# Patient Record
Sex: FEMALE | Race: WHITE | Employment: UNEMPLOYED | URBAN - METROPOLITAN AREA
[De-identification: names, ages, dates, MRNs, and addresses within clinical notes are randomized per-mention and may not be internally consistent; named-entity substitution may affect disease eponyms.]

---

## 2017-01-27 PROBLEM — D25.9 UTERINE LEIOMYOMA: Status: ACTIVE | Noted: 2017-01-27

## 2017-01-27 PROBLEM — G47.00 INSOMNIA: Status: ACTIVE | Noted: 2017-01-27

## 2017-01-27 PROBLEM — F39 MOOD DISORDER (HCC): Status: ACTIVE | Noted: 2017-01-27

## 2017-01-27 PROBLEM — F41.1 GAD (GENERALIZED ANXIETY DISORDER): Status: ACTIVE | Noted: 2017-01-27

## 2017-01-27 PROBLEM — F41.0 PANIC ATTACKS: Status: ACTIVE | Noted: 2017-01-27

## 2017-01-27 PROBLEM — Z86.69 HX OF MIGRAINES: Status: ACTIVE | Noted: 2017-01-27

## 2018-01-11 ENCOUNTER — OFFICE VISIT (OUTPATIENT)
Dept: FAMILY MEDICINE CLINIC | Age: 33
End: 2018-01-11
Payer: MEDICARE

## 2018-01-11 VITALS
HEIGHT: 59 IN | SYSTOLIC BLOOD PRESSURE: 109 MMHG | DIASTOLIC BLOOD PRESSURE: 74 MMHG | TEMPERATURE: 98.5 F | BODY MASS INDEX: 27.26 KG/M2 | OXYGEN SATURATION: 98 % | WEIGHT: 135.2 LBS | HEART RATE: 82 BPM

## 2018-01-11 DIAGNOSIS — Z87.898 H/O MOTION SICKNESS: ICD-10-CM

## 2018-01-11 DIAGNOSIS — M79.7 FIBROMYALGIA: Primary | ICD-10-CM

## 2018-01-11 DIAGNOSIS — J45.20 MILD INTERMITTENT ASTHMA WITHOUT COMPLICATION: ICD-10-CM

## 2018-01-11 DIAGNOSIS — K58.2 IRRITABLE BOWEL SYNDROME WITH BOTH CONSTIPATION AND DIARRHEA: ICD-10-CM

## 2018-01-11 DIAGNOSIS — J30.2 CHRONIC SEASONAL ALLERGIC RHINITIS, UNSPECIFIED TRIGGER: ICD-10-CM

## 2018-01-11 DIAGNOSIS — F41.1 GAD (GENERALIZED ANXIETY DISORDER): ICD-10-CM

## 2018-01-11 DIAGNOSIS — R29.818 DIFFICULTY BALANCING: ICD-10-CM

## 2018-01-11 DIAGNOSIS — F31.30 BIPOLAR I DISORDER, MOST RECENT EPISODE DEPRESSED (HCC): ICD-10-CM

## 2018-01-11 PROCEDURE — 99214 OFFICE O/P EST MOD 30 MIN: CPT | Performed by: FAMILY MEDICINE

## 2018-01-11 PROCEDURE — G8419 CALC BMI OUT NRM PARAM NOF/U: HCPCS | Performed by: FAMILY MEDICINE

## 2018-01-11 PROCEDURE — 1036F TOBACCO NON-USER: CPT | Performed by: FAMILY MEDICINE

## 2018-01-11 PROCEDURE — G8427 DOCREV CUR MEDS BY ELIG CLIN: HCPCS | Performed by: FAMILY MEDICINE

## 2018-01-11 PROCEDURE — G8484 FLU IMMUNIZE NO ADMIN: HCPCS | Performed by: FAMILY MEDICINE

## 2018-01-11 RX ORDER — PROMETHAZINE HYDROCHLORIDE 25 MG/1
12.5 TABLET ORAL EVERY 6 HOURS PRN
Qty: 20 TABLET | Refills: 0 | Status: SHIPPED | OUTPATIENT
Start: 2018-01-11 | End: 2019-01-03 | Stop reason: SDUPTHER

## 2018-01-11 RX ORDER — ALBUTEROL SULFATE 90 UG/1
2 AEROSOL, METERED RESPIRATORY (INHALATION) EVERY 6 HOURS PRN
Qty: 1 INHALER | Refills: 0 | Status: SHIPPED | OUTPATIENT
Start: 2018-01-11 | End: 2018-08-27 | Stop reason: SDUPTHER

## 2018-01-11 RX ORDER — LORATADINE 10 MG/1
10 TABLET ORAL DAILY
Qty: 30 TABLET | Refills: 3 | Status: SHIPPED | OUTPATIENT
Start: 2018-01-11 | End: 2019-04-10

## 2018-01-11 RX ORDER — MONTELUKAST SODIUM 10 MG/1
10 TABLET ORAL DAILY
Qty: 30 TABLET | Refills: 3 | Status: SHIPPED | OUTPATIENT
Start: 2018-01-11 | End: 2018-04-25 | Stop reason: ALTCHOICE

## 2018-01-11 RX ORDER — DICYCLOMINE HYDROCHLORIDE 10 MG/1
10 CAPSULE ORAL EVERY 6 HOURS PRN
Qty: 20 CAPSULE | Refills: 0 | Status: SHIPPED | OUTPATIENT
Start: 2018-01-11 | End: 2018-01-11 | Stop reason: SDUPTHER

## 2018-01-11 ASSESSMENT — PATIENT HEALTH QUESTIONNAIRE - PHQ9
1. LITTLE INTEREST OR PLEASURE IN DOING THINGS: 1
SUM OF ALL RESPONSES TO PHQ9 QUESTIONS 1 & 2: 2
2. FEELING DOWN, DEPRESSED OR HOPELESS: 1
1. LITTLE INTEREST OR PLEASURE IN DOING THINGS: 1
SUM OF ALL RESPONSES TO PHQ QUESTIONS 1-9: 2
SUM OF ALL RESPONSES TO PHQ QUESTIONS 1-9: 2
2. FEELING DOWN, DEPRESSED OR HOPELESS: 1
SUM OF ALL RESPONSES TO PHQ9 QUESTIONS 1 & 2: 2

## 2018-01-11 ASSESSMENT — ENCOUNTER SYMPTOMS
ABDOMINAL PAIN: 0
DIARRHEA: 1
COUGH: 0
COLOR CHANGE: 0
BACK PAIN: 0
SHORTNESS OF BREATH: 0
CONSTIPATION: 1

## 2018-01-11 NOTE — PROGRESS NOTES
for headaches. Negative for dizziness, weakness and light-headedness. Psychiatric/Behavioral: Negative for behavioral problems, confusion and sleep disturbance. The patient is nervous/anxious. Objective:   Physical Exam   Constitutional: She is oriented to person, place, and time. She appears well-developed and well-nourished. HENT:   Head: Normocephalic and atraumatic. Right Ear: External ear normal.   Left Ear: External ear normal.   Nose: Nose normal.   Mouth/Throat: Oropharynx is clear and moist. No oropharyngeal exudate. Eyes: Conjunctivae and EOM are normal. Pupils are equal, round, and reactive to light. Right eye exhibits no discharge. Left eye exhibits no discharge. No scleral icterus. Neck: Normal range of motion. Neck supple. No JVD present. No tracheal deviation present. No thyromegaly present. Cardiovascular: Normal rate, regular rhythm, normal heart sounds and intact distal pulses. Exam reveals no gallop and no friction rub. No murmur heard. Pulmonary/Chest: Effort normal and breath sounds normal. No respiratory distress. She has no wheezes. She has no rales. She exhibits no tenderness. Abdominal: Soft. Bowel sounds are normal. She exhibits no distension and no mass. There is no tenderness. There is no rebound and no guarding. Musculoskeletal: Normal range of motion. She exhibits no edema or tenderness. Lymphadenopathy:     She has no cervical adenopathy. Neurological: She is alert and oriented to person, place, and time. She has normal reflexes. No cranial nerve deficit. Coordination normal.   Skin: Skin is warm and dry. No rash noted. Psychiatric: She has a normal mood and affect. Assessment:      1. Fibromyalgia     2. Bipolar I disorder, most recent episode depressed ([address]     3. NIKKI (generalized anxiety disorder)     4. Irritable bowel syndrome with both constipation and diarrhea  dicyclomine (BENTYL) 10 MG capsule   5.  H/O motion sickness  promethazine

## 2018-04-25 ENCOUNTER — HOSPITAL ENCOUNTER (OUTPATIENT)
Age: 33
Setting detail: SPECIMEN
Discharge: HOME | End: 2018-04-25
Payer: MEDICARE

## 2018-04-25 DIAGNOSIS — Z00.00 WELL ADULT HEALTH CHECK: ICD-10-CM

## 2018-04-25 DIAGNOSIS — R53.83 FATIGUE, UNSPECIFIED TYPE: ICD-10-CM

## 2018-04-25 LAB
ALBUMIN SERPL-MCNC: 4.3 G/DL (ref 3.5–5.2)
ALBUMIN/GLOBULIN RATIO: 1.4 (ref 1–2.5)
ALP BLD-CCNC: 53 U/L (ref 35–104)
ALT SERPL-CCNC: 10 U/L (ref 5–33)
ANION GAP SERPL CALCULATED.3IONS-SCNC: 13 MMOL/L (ref 9–17)
AST SERPL-CCNC: 13 U/L
BILIRUB SERPL-MCNC: 0.26 MG/DL (ref 0.3–1.2)
BUN BLDV-MCNC: 13 MG/DL (ref 6–20)
BUN/CREAT BLD: ABNORMAL (ref 9–20)
CALCIUM SERPL-MCNC: 9.2 MG/DL (ref 8.6–10.4)
CHLORIDE BLD-SCNC: 101 MMOL/L (ref 98–107)
CO2: 24 MMOL/L (ref 20–31)
CREAT SERPL-MCNC: 0.72 MG/DL (ref 0.5–0.9)
GFR AFRICAN AMERICAN: >60 ML/MIN
GFR NON-AFRICAN AMERICAN: >60 ML/MIN
GFR SERPL CREATININE-BSD FRML MDRD: ABNORMAL ML/MIN/{1.73_M2}
GFR SERPL CREATININE-BSD FRML MDRD: ABNORMAL ML/MIN/{1.73_M2}
GLUCOSE BLD-MCNC: 84 MG/DL (ref 70–99)
HCT VFR BLD CALC: 41.9 % (ref 36.3–47.1)
HEMOGLOBIN: 13.7 G/DL (ref 11.9–15.1)
MCH RBC QN AUTO: 30.2 PG (ref 25.2–33.5)
MCHC RBC AUTO-ENTMCNC: 32.7 G/DL (ref 28.4–34.8)
MCV RBC AUTO: 92.3 FL (ref 82.6–102.9)
NRBC AUTOMATED: 0 PER 100 WBC
PDW BLD-RTO: 12.4 % (ref 11.8–14.4)
PLATELET # BLD: 362 K/UL (ref 138–453)
PMV BLD AUTO: 10.1 FL (ref 8.1–13.5)
POTASSIUM SERPL-SCNC: 4.5 MMOL/L (ref 3.7–5.3)
RBC # BLD: 4.54 M/UL (ref 3.95–5.11)
SODIUM BLD-SCNC: 138 MMOL/L (ref 135–144)
TOTAL PROTEIN: 7.4 G/DL (ref 6.4–8.3)
WBC # BLD: 6.8 K/UL (ref 3.5–11.3)

## 2018-04-26 LAB
EBV EARLY ANTIGEN IGG: 22 U/ML
EBV INTERPRETATION: ABNORMAL
EBV NUCLEAR AG AB: 818 U/ML
EPSTEIN-BARR VCA IGG: 839 U/ML
EPSTEIN-BARR VCA IGM: 6 U/ML

## 2019-04-10 ENCOUNTER — HOSPITAL ENCOUNTER (OUTPATIENT)
Age: 34
Setting detail: SPECIMEN
Discharge: HOME | End: 2019-04-10
Payer: MEDICARE

## 2019-04-10 DIAGNOSIS — B27.00 EBV (EPSTEIN-BARR VIRUS) VIREMIA: ICD-10-CM

## 2019-04-10 DIAGNOSIS — M79.7 FIBROMYALGIA: ICD-10-CM

## 2019-04-10 DIAGNOSIS — Z00.00 WELL ADULT HEALTH CHECK: ICD-10-CM

## 2019-04-10 LAB
HCT VFR BLD CALC: 41.7 % (ref 36.3–47.1)
HEMOGLOBIN: 13.7 G/DL (ref 11.9–15.1)
MCH RBC QN AUTO: 31.4 PG (ref 25.2–33.5)
MCHC RBC AUTO-ENTMCNC: 32.9 G/DL (ref 28.4–34.8)
MCV RBC AUTO: 95.4 FL (ref 82.6–102.9)
NRBC AUTOMATED: 0 PER 100 WBC
PDW BLD-RTO: 12 % (ref 11.8–14.4)
PLATELET # BLD: 321 K/UL (ref 138–453)
PMV BLD AUTO: 10.2 FL (ref 8.1–13.5)
RBC # BLD: 4.37 M/UL (ref 3.95–5.11)
VITAMIN B-12: 555 PG/ML (ref 232–1245)
VITAMIN D 25-HYDROXY: 28.9 NG/ML (ref 30–100)
WBC # BLD: 7.8 K/UL (ref 3.5–11.3)

## 2020-03-12 ENCOUNTER — HOSPITAL ENCOUNTER (OUTPATIENT)
Age: 35
Setting detail: SPECIMEN
Discharge: HOME | End: 2020-03-12
Payer: MEDICARE

## 2020-03-12 LAB
ABSOLUTE EOS #: 0.08 K/UL (ref 0–0.44)
ABSOLUTE IMMATURE GRANULOCYTE: 0.03 K/UL (ref 0–0.3)
ABSOLUTE LYMPH #: 1.83 K/UL (ref 1.1–3.7)
ABSOLUTE MONO #: 0.69 K/UL (ref 0.1–1.2)
ALBUMIN SERPL-MCNC: 4.3 G/DL (ref 3.5–5.2)
ALBUMIN/GLOBULIN RATIO: 1.3 (ref 1–2.5)
ALP BLD-CCNC: 51 U/L (ref 35–104)
ALT SERPL-CCNC: 11 U/L (ref 5–33)
ANION GAP SERPL CALCULATED.3IONS-SCNC: 12 MMOL/L (ref 9–17)
AST SERPL-CCNC: 15 U/L
BASOPHILS # BLD: 1 % (ref 0–2)
BASOPHILS ABSOLUTE: 0.05 K/UL (ref 0–0.2)
BILIRUB SERPL-MCNC: 0.18 MG/DL (ref 0.3–1.2)
BILIRUBIN URINE: NEGATIVE
BUN BLDV-MCNC: 12 MG/DL (ref 6–20)
BUN/CREAT BLD: ABNORMAL (ref 9–20)
CALCIUM SERPL-MCNC: 9.4 MG/DL (ref 8.6–10.4)
CHLORIDE BLD-SCNC: 103 MMOL/L (ref 98–107)
CO2: 22 MMOL/L (ref 20–31)
COLOR: YELLOW
COMMENT UA: NORMAL
CREAT SERPL-MCNC: 0.71 MG/DL (ref 0.5–0.9)
DIFFERENTIAL TYPE: ABNORMAL
EOSINOPHILS RELATIVE PERCENT: 1 % (ref 1–4)
GFR AFRICAN AMERICAN: >60 ML/MIN
GFR NON-AFRICAN AMERICAN: >60 ML/MIN
GFR SERPL CREATININE-BSD FRML MDRD: ABNORMAL ML/MIN/{1.73_M2}
GFR SERPL CREATININE-BSD FRML MDRD: ABNORMAL ML/MIN/{1.73_M2}
GLUCOSE BLD-MCNC: 87 MG/DL (ref 70–99)
GLUCOSE URINE: NEGATIVE
HCT VFR BLD CALC: 42.2 % (ref 36.3–47.1)
HEMOGLOBIN: 13.6 G/DL (ref 11.9–15.1)
IMMATURE GRANULOCYTES: 0 %
KETONES, URINE: NEGATIVE
LEUKOCYTE ESTERASE, URINE: NEGATIVE
LYMPHOCYTES # BLD: 17 % (ref 24–43)
MCH RBC QN AUTO: 30.8 PG (ref 25.2–33.5)
MCHC RBC AUTO-ENTMCNC: 32.2 G/DL (ref 28.4–34.8)
MCV RBC AUTO: 95.5 FL (ref 82.6–102.9)
MONOCYTES # BLD: 7 % (ref 3–12)
NITRITE, URINE: NEGATIVE
NRBC AUTOMATED: 0 PER 100 WBC
PDW BLD-RTO: 12.2 % (ref 11.8–14.4)
PH UA: 6 (ref 5–8)
PLATELET # BLD: 345 K/UL (ref 138–453)
PLATELET ESTIMATE: ABNORMAL
PMV BLD AUTO: 10.2 FL (ref 8.1–13.5)
POTASSIUM SERPL-SCNC: 4.3 MMOL/L (ref 3.7–5.3)
PROTEIN UA: NEGATIVE
RBC # BLD: 4.42 M/UL (ref 3.95–5.11)
RBC # BLD: ABNORMAL 10*6/UL
SEG NEUTROPHILS: 74 % (ref 36–65)
SEGMENTED NEUTROPHILS ABSOLUTE COUNT: 7.88 K/UL (ref 1.5–8.1)
SODIUM BLD-SCNC: 137 MMOL/L (ref 135–144)
SPECIFIC GRAVITY UA: 1.01 (ref 1–1.03)
TOTAL PROTEIN: 7.7 G/DL (ref 6.4–8.3)
TURBIDITY: CLEAR
URINE HGB: NEGATIVE
UROBILINOGEN, URINE: NORMAL
WBC # BLD: 10.6 K/UL (ref 3.5–11.3)
WBC # BLD: ABNORMAL 10*3/UL

## 2020-09-22 ENCOUNTER — INITIAL CONSULT (OUTPATIENT)
Dept: PHYSICAL MEDICINE AND REHAB | Age: 35
End: 2020-09-22
Payer: MEDICARE

## 2020-09-22 VITALS
BODY MASS INDEX: 28.98 KG/M2 | HEIGHT: 60 IN | TEMPERATURE: 98.2 F | WEIGHT: 147.6 LBS | DIASTOLIC BLOOD PRESSURE: 65 MMHG | HEART RATE: 84 BPM | SYSTOLIC BLOOD PRESSURE: 104 MMHG

## 2020-09-22 PROCEDURE — 1036F TOBACCO NON-USER: CPT | Performed by: PHYSICAL MEDICINE & REHABILITATION

## 2020-09-22 PROCEDURE — G8427 DOCREV CUR MEDS BY ELIG CLIN: HCPCS | Performed by: PHYSICAL MEDICINE & REHABILITATION

## 2020-09-22 PROCEDURE — G8419 CALC BMI OUT NRM PARAM NOF/U: HCPCS | Performed by: PHYSICAL MEDICINE & REHABILITATION

## 2020-09-22 PROCEDURE — 99204 OFFICE O/P NEW MOD 45 MIN: CPT | Performed by: PHYSICAL MEDICINE & REHABILITATION

## 2020-09-22 RX ORDER — DULOXETIN HYDROCHLORIDE 20 MG/1
20 CAPSULE, DELAYED RELEASE ORAL DAILY
Qty: 30 CAPSULE | Refills: 3 | Status: SHIPPED | OUTPATIENT
Start: 2020-09-22 | End: 2021-01-05
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
## 2020-11-04 ENCOUNTER — OFFICE VISIT (OUTPATIENT)
Dept: NEUROLOGY | Age: 35
End: 2020-11-04
Payer: MEDICARE

## 2020-11-04 VITALS
BODY MASS INDEX: 29.29 KG/M2 | HEART RATE: 79 BPM | DIASTOLIC BLOOD PRESSURE: 75 MMHG | TEMPERATURE: 96.4 F | SYSTOLIC BLOOD PRESSURE: 118 MMHG | RESPIRATION RATE: 16 BRPM | WEIGHT: 150 LBS

## 2020-11-04 PROCEDURE — 99205 OFFICE O/P NEW HI 60 MIN: CPT | Performed by: STUDENT IN AN ORGANIZED HEALTH CARE EDUCATION/TRAINING PROGRAM

## 2020-11-04 PROCEDURE — G8419 CALC BMI OUT NRM PARAM NOF/U: HCPCS | Performed by: STUDENT IN AN ORGANIZED HEALTH CARE EDUCATION/TRAINING PROGRAM

## 2020-11-04 PROCEDURE — G8484 FLU IMMUNIZE NO ADMIN: HCPCS | Performed by: STUDENT IN AN ORGANIZED HEALTH CARE EDUCATION/TRAINING PROGRAM

## 2020-11-04 PROCEDURE — G8427 DOCREV CUR MEDS BY ELIG CLIN: HCPCS | Performed by: STUDENT IN AN ORGANIZED HEALTH CARE EDUCATION/TRAINING PROGRAM

## 2020-11-04 PROCEDURE — 1036F TOBACCO NON-USER: CPT | Performed by: STUDENT IN AN ORGANIZED HEALTH CARE EDUCATION/TRAINING PROGRAM

## 2020-11-04 RX ORDER — MAGNESIUM OXIDE 400 MG/1
400 TABLET ORAL DAILY
Qty: 30 TABLET | Refills: 3 | Status: SHIPPED | OUTPATIENT
Start: 2020-11-04 | End: 2020-12-04

## 2020-11-04 RX ORDER — ACETAMINOPHEN, ASPIRIN AND CAFFEINE 250; 250; 65 MG/1; MG/1; MG/1
2 TABLET, FILM COATED ORAL PRN
Qty: 28 TABLET | Refills: 2 | Status: SHIPPED | OUTPATIENT
Start: 2020-11-04

## 2020-11-04 RX ORDER — DIPHENHYDRAMINE HCL 25 MG
25 CAPSULE ORAL 2 TIMES DAILY PRN
Qty: 28 CAPSULE | Refills: 2 | Status: SHIPPED | OUTPATIENT
Start: 2020-11-04

## 2020-11-04 RX ORDER — RIZATRIPTAN BENZOATE 10 MG/1
10 TABLET ORAL
Qty: 10 TABLET | Refills: 3 | Status: SHIPPED | OUTPATIENT
Start: 2020-11-04 | End: 2021-01-14

## 2020-11-04 RX ORDER — PROMETHAZINE HYDROCHLORIDE 12.5 MG/1
12.5 TABLET ORAL PRN
Qty: 28 TABLET | Refills: 2 | Status: SHIPPED | OUTPATIENT
Start: 2020-11-04

## 2020-11-04 ASSESSMENT — ENCOUNTER SYMPTOMS
SINUS PAIN: 0
ABDOMINAL PAIN: 1
BACK PAIN: 1
CONSTIPATION: 0
SHORTNESS OF BREATH: 0
DIARRHEA: 0
EYE DISCHARGE: 0
EYE PAIN: 0
COUGH: 0
SORE THROAT: 0
PHOTOPHOBIA: 1
EYE REDNESS: 0
VOMITING: 0
NAUSEA: 1

## 2020-11-04 NOTE — PROGRESS NOTES
[address] [place] [address],  [address], [facility], [address] [facility], [address] [place] [address]  P: [phone]  F: [phone]    NEUROLOGY CLINIC NOTE     PATIENT NAME: Sis Miller  PATIENT MRN: [id number]  PRIMARY CARE PHYSICIAN: Ana Cordova MD    HPI:      Sis Miller is a 28 y.o. right handed  female was seen in the clinic for headaches    History obtained from Patient and medical records      Headaches  Headaches started having headaches since her childhood, getting worse since age 21. Patient endorses headache in variable locations, depending upon the triggers. She endorses headache at least once or twice in a week, severe headache maybe 2 times in a month, 7-8/10 intensity, throbbing/pulsating, pressure-like/squeezing, sometimes sharp stabbing pain or dull achy pain. Headaches are usually associated with nausea, vomiting, photophobia, phonophobia, blurry vision, sparkling light, zigzag lines, flashes of light, had an episode of decreased vision in the left eye around 5 times until now. She also endorses focal tingling and numbness, dizziness and lightheadedness and fatigue. Denies any focal muscle weakness or speech changes or personality changes or mood changes. Denies any neck pain or radicular symptoms. Denies any redness in the eyes or watering from the eyes or watering from the nose or facial sweating. Usually headaches are gradual in onset, sometimes she is waking up with a headache in the morning usually builds up in half an hour, can last for hours to days depending on the medication she is taking. Bula Dayhoff     Headaches are typically triggered by stress, skipping meals, hunger , fasting, strong smells, bright lights, sunlight, noises, seasonal changes and weather changes  Headaches are relieved by lying in a darkened room    Headache free days per month: 15-20  Cutaneous allodynia: yes -most of the time  History of:  Family history of headaches or migraines: yes - father  Renal stones: no   Motion sickness:  no  Head/Neck Trauma: yes   Stressors: no  Sleep: 5- 6  Hours,            Difficulty in initiating or maintaining sleep: no,              Snoring: no  Psych/Mood: Appropriate  Caffeine: 4-5 Oz coffee per day    Headache Medications  Current abortive meds: Maxalt patient endorses significant improvement in her headache with Maxalt.   Excedrin Migraine, Motrin, Tylenol  Current prophylactic meds: Magnesium oxide  Previous abortive medications tried: Imitrex-endorses feeling sick to stomach and vomiting with it  Previous prophylactic medications tried: Nortriptyline, Topamax had nausea with it                                                                       PATIENT HISTORY:     Past Medical History:   Diagnosis Date    Anxiety     Asthma     Colitis     Depression     Fibromyalgia     Irritable bowel syndrome         Past Surgical History:   Procedure Laterality Date    COLONOSCOPY  2016    HYSTERECTOMY, TOTAL ABDOMINAL      still has ovaries        Social History     Socioeconomic History    Marital status: Single     Spouse name: Not on file    Number of children: Not on file    Years of education: Not on file    Highest education level: Not on file   Occupational History    Not on file   Social Needs    Financial resource strain: Not on file    Food insecurity     Worry: Not on file     Inability: Not on file    Transportation needs     Medical: Not on file     Non-medical: Not on file   Tobacco Use    Smoking status: Former Smoker     Packs/day: 0.25    Smokeless tobacco: Never Used    Tobacco comment: light smoker age 25 -20, approx 6 cig a day   Substance and Sexual Activity    Alcohol use: No    Drug use: No    Sexual activity: Yes     Partners: Male   Lifestyle    Physical activity     Days per week: Not on file     Minutes per session: Not on file    Stress: Not on file   Relationships    Social connections     Talks on phone: Not on file     Gets together: Not on file     Attends Mandaen service: Not on file     Active member of club or organization: Not on file     Attends meetings of clubs or organizations: Not on file     Relationship status: Not on file    Intimate partner violence     Fear of current or ex partner: Not on file     Emotionally abused: Not on file     Physically abused: Not on file     Forced sexual activity: Not on file   Other Topics Concern    Not on file   Social History Narrative    Not on file        Current Outpatient Medications   Medication Sig Dispense Refill    diphenhydrAMINE (BENADRYL ALLERGY) 25 MG capsule Take 1 capsule by mouth 2 times daily as needed (Severe headache with nausea and vomiting.) 28 capsule 2    aspirin-acetaminophen-caffeine (EXCEDRIN MIGRAINE) 250-250-65 MG per tablet Take 2 tablets by mouth as needed for Headaches (severe headaches) Take 2 tablets once every 24 hours (maximum: 2 tablets/day). Note: Limit use to no more than 2 days per week 28 tablet 2    magnesium oxide (MAG-OX) 400 MG tablet Take 1 tablet by mouth daily 30 tablet 3    rizatriptan (MAXALT) 10 MG tablet Take 1 tablet by mouth once as needed for Migraine Take 1 pill immediately when you start having the aura or at the onset of headache.   Can take second tablet if headache has not resolved after 2 hours    Avoid taking more than 2 pills in 24 hours and not more than 4 pills in a week 10 tablet 3    promethazine (PHENERGAN) 12.5 MG tablet Take 1 tablet by mouth as needed for Nausea (severe headaches with nausea and vomiting.) 28 tablet 2    DULoxetine (CYMBALTA) 20 MG extended release capsule Take 1 capsule by mouth daily 30 capsule 3    tiZANidine (ZANAFLEX) 4 MG tablet Take 1 tablet by mouth nightly Dr Sunita Harris 90 tablet 0    ibuprofen (ADVIL;MOTRIN) 400 MG tablet take 1 tablet by mouth every 6 hours if needed for pain 120 tablet 1    albuterol (PROVENTIL) (5 MG/ML) 0.5% nebulizer solution Take 1 mL by nebulization 4 times daily as needed for Wheezing 120 each 3    meclizine (ANTIVERT) 25 MG tablet Take 1 tablet by mouth 3 times daily as needed for Dizziness 90 tablet 1    albuterol sulfate  (90 Base) MCG/ACT inhaler Inhale 2 puffs into the lungs every 6 hours as needed for Wheezing or Shortness of Breath 1 Inhaler 1    ondansetron (ZOFRAN) 4 MG tablet Take 4 mg by mouth every 8 hours as needed for Nausea or Vomiting      Magnesium 100 MG TABS Take 350 mg by mouth daily       Calcium Carb-Cholecalciferol (CALCIUM 1000 + D PO) Take by mouth daily 600mg/400mg      loperamide (IMODIUM) 2 MG capsule Take 2 mg by mouth 4 times daily as needed for Diarrhea      Lactase (DAIRY AID PO) Take by mouth as needed      Biotin 5000 MCG TABS Take by mouth daily      TURMERIC PO Take 1 capsule by mouth daily      COCONUT OIL PO Take 1 capsule by mouth daily      Multiple Vitamins-Minerals (THERAPEUTIC MULTIVITAMIN-MINERALS) tablet Take 1 tablet by mouth daily      dicyclomine (BENTYL) 20 MG tablet Take 20 mg by mouth every 6 hours      rizatriptan (MAXALT-MLT) 10 MG disintegrating tablet Take 1 tablet by mouth once as needed for Migraine (as needed twice a week as needed) May repeat in 2 hours if needed 30 tablet 1     No current facility-administered medications for this visit. ALLERGIES  Allergies   Allergen Reactions    Latex Rash    Doxycycline Other (See Comments)     headaches    Lyrica [Pregabalin]      Weight gain, vision problems      Paxil [Paroxetine Hcl]      Tremors and stiffness    Qvar [Beclomethasone]      thrush    Seasonal     Serotonin Reuptake Inhibitors (Ssris)      zombie    Trazodone And Nefazodone      Tired next day    Vicodin [Hydrocodone-Acetaminophen]     Z-Luke [Azithromycin]         REVIEW OF SYSTEMS:     Review of Systems   Constitutional: Positive for fatigue. Negative for chills, diaphoresis, fever and unexpected weight change.    HENT: Negative for congestion, confusion, speech or language problems; no hallucinations or delusions     Cranial nerves   II - visual fields intact to confrontation                                                III, IV, VI - extra-ocular muscles full: no pupillary defect; no DENICE, no nystagmus, no ptosis   V - normal facial sensation                                                               VII - normal facial symmetry                                                             VIII - intact hearing                                                                             IX, X - symmetrical palate                                                                  XI - symmetrical shoulder shrug                                                       XII - midline tongue without atrophy or fasciculation     Motor function  Normal muscle bulk and tone  Muscle strength: normal power 5/5     Sensory function Intact to light touch in bilateral upper and lower extremities. Cerebellar No involuntary movements or tremors     Reflex function Intact 2+ DTR and symmetric. Negative Babinski     Gait                  Normal station and gait           PRIOR TESTS AND IMAGING: Following images and Labs were reviewed by the examiner         MRI Brain w and wo:      Vit D :      ASSESSMENT / PLAN:     Darshan Ramos is a 28 y.o. right handed  female was seen in the clinic for headaches      Worsening headaches  Chronic migraine headaches with aura  Chronic migraine headaches without aura  Ocular migraine  History of fibromyalgia  History of IBS  Anxiety and depression  Asthma  Former smoker    Patient is allergic to Lyrica endorses weight gain vision problem with that. She is also allergic to SSRIs endorses feeling zombie with that. Plan      - For Headache Prevention:  Continue magnesium oxide 400 mg daily for headache prophylaxis.      Patient had tried nortriptyline and Topamax in the past.  Denies any improvement in the headache, had side effects with the medication.    - For mild to moderate headache  Excedrin Migraine Oral:   (Acetaminophen 250 mg/aspirin 250 mg/caffeine 65 mg)  2 tablets once every 24 hours (maximum: 2 tablets/day). Note: Limit use to no more than 2 days per week        - For severe headache            Maxalt 10 mg: abortive medication for migraine:   Take 1 pill immediately when you start having the aura or at the onset of headache. Can take a second dose after 2 hrs if headache doesn't improve. Avoid taking more than 2 pills in 24 hours and not more than 4 pills in a week  Potential side effects: Chest tightness, neck and jaw discomfort, numbness/tingling, sensation of cold, nausea, dizziness, muscle weakness       - For rescue   Benadryl 25 mg twice daily PRN with Phenergan 12.5 mg twice daily as needed for nausea vomiting associated with severe headaches. - If acute medications fail, including the rescue medication, and you still have a bad headache, please call the clinic for further treatment options, including possible nerve blocks. - Tests:    MRI brain with and without contrast to rule out any structural or vascular etiology contributing to worsening headaches. Patient has a family history of multiple sclerosis- cousins   LABS: Vitamin D level    - Maintain Headache diary to see if there is a pattern to your headaches. - Discussed headache triggers with patients and instructed to avoid them. Non pharmacological preventive strategies for migraine headaches:   Wear sunglasses when in bright light.  Avoid loud and continuously noisy surroundings   avoid flashy lights   Keep yourself hydrated (8 to 10 glasses of water)   ensure 3 to 4 meals at regular times a day   look for and avoid food related triggers (chocolate ronel, etc.)   maintain good sleep hygiene, regular 8 to 10 hours of sleep   avoid staying up late, late night partying.      - Follow up in the clinic in 6 weeks.    - Instructed patient to call the clinic if symptoms worsen or develop any new symptoms.        Electronically signed by Cholo Woodward MD on 11/4/2020 at 2:56 PM

## 2020-11-05 ENCOUNTER — TELEPHONE (OUTPATIENT)
Dept: NEUROSURGERY | Age: 35
End: 2020-11-05

## 2020-11-05 RX ORDER — LORAZEPAM 0.5 MG/1
0.5 TABLET ORAL PRN
Qty: 2 TABLET | Refills: 0 | Status: SHIPPED | OUTPATIENT
Start: 2020-11-05 | End: 2020-12-05

## 2020-11-05 RX ORDER — DIAZEPAM 2 MG/1
2 TABLET ORAL PRN
Qty: 2 TABLET | Refills: 0 | Status: SHIPPED | OUTPATIENT
Start: 2020-11-05 | End: 2020-12-05

## 2020-11-05 NOTE — TELEPHONE ENCOUNTER
Prescribe Ativan 0.5 mg 30 minutes prior to the MRI. Can repeat the dose if needed after 30 minutes.     Thank you

## 2020-11-05 NOTE — TELEPHONE ENCOUNTER
Called patient and she stated that she has never taken Ativan. Can you give her Valium instead? That was what she was given in past before any procedures.

## 2020-11-14 ENCOUNTER — HOSPITAL ENCOUNTER (OUTPATIENT)
Dept: MRI IMAGING | Age: 35
Discharge: HOME | End: 2020-11-16
Payer: MEDICARE

## 2020-11-14 PROCEDURE — 2580000003 HC RX 258: Performed by: STUDENT IN AN ORGANIZED HEALTH CARE EDUCATION/TRAINING PROGRAM

## 2020-11-14 PROCEDURE — A9579 GAD-BASE MR CONTRAST NOS,1ML: HCPCS | Performed by: STUDENT IN AN ORGANIZED HEALTH CARE EDUCATION/TRAINING PROGRAM

## 2020-11-14 PROCEDURE — 70553 MRI BRAIN STEM W/O & W/DYE: CPT

## 2020-11-14 PROCEDURE — 6360000004 HC RX CONTRAST MEDICATION: Performed by: STUDENT IN AN ORGANIZED HEALTH CARE EDUCATION/TRAINING PROGRAM

## 2020-11-14 RX ORDER — SODIUM CHLORIDE 0.9 % (FLUSH) 0.9 %
10 SYRINGE (ML) INJECTION PRN
Status: DISCONTINUED | OUTPATIENT
Start: 2020-11-14 | End: 2020-11-17 | Stop reason: HOSPADM

## 2020-11-14 RX ADMIN — Medication 10 ML: at 15:44

## 2020-11-14 RX ADMIN — GADOTERIDOL 15 ML: 279.3 INJECTION, SOLUTION INTRAVENOUS at 15:44

## 2020-11-17 ENCOUNTER — OFFICE VISIT (OUTPATIENT)
Dept: PHYSICAL MEDICINE AND REHAB | Age: 35
End: 2020-11-17
Payer: MEDICARE

## 2020-11-17 VITALS
TEMPERATURE: 98.2 F | BODY MASS INDEX: 29.29 KG/M2 | WEIGHT: 149.2 LBS | HEIGHT: 60 IN | HEART RATE: 75 BPM | DIASTOLIC BLOOD PRESSURE: 70 MMHG | SYSTOLIC BLOOD PRESSURE: 101 MMHG

## 2020-11-17 PROCEDURE — G8484 FLU IMMUNIZE NO ADMIN: HCPCS | Performed by: PHYSICAL MEDICINE & REHABILITATION

## 2020-11-17 PROCEDURE — G8419 CALC BMI OUT NRM PARAM NOF/U: HCPCS | Performed by: PHYSICAL MEDICINE & REHABILITATION

## 2020-11-17 PROCEDURE — 1036F TOBACCO NON-USER: CPT | Performed by: PHYSICAL MEDICINE & REHABILITATION

## 2020-11-17 PROCEDURE — G8427 DOCREV CUR MEDS BY ELIG CLIN: HCPCS | Performed by: PHYSICAL MEDICINE & REHABILITATION

## 2020-11-17 PROCEDURE — 20553 NJX 1/MLT TRIGGER POINTS 3/>: CPT | Performed by: PHYSICAL MEDICINE & REHABILITATION

## 2020-11-17 PROCEDURE — 99212 OFFICE O/P EST SF 10 MIN: CPT | Performed by: PHYSICAL MEDICINE & REHABILITATION

## 2020-11-17 RX ORDER — LIDOCAINE HYDROCHLORIDE 10 MG/ML
5 INJECTION, SOLUTION INFILTRATION; PERINEURAL ONCE
Status: COMPLETED | OUTPATIENT
Start: 2020-11-17 | End: 2020-11-17

## 2020-11-17 RX ADMIN — LIDOCAINE HYDROCHLORIDE 5 ML: 10 INJECTION, SOLUTION INFILTRATION; PERINEURAL at 16:11

## 2020-11-17 NOTE — PROGRESS NOTES
[facility]  [facility] PHYSICAL MEDICINE & REHABILITATION  [address] [place] [address]  [place] [id number]  Dept: [phone]  Dept Fax: [phone]    Outpatient Followup Note    Audrey Flores, 28 y.o., female, presents for follow up c/o of Follow-up (Fibromyalgia)  . HPI:     HPI  Patient with fibromyalgia and neck pain which always precedes a migraine headaches who is being seen in follow up today. She is treating with neurology for the headaches but notes significant muscular tightness and pain in the neck and back muscles. Today her pain and muscle tightness is worse in the cervical paraspinal muscles and upper trapezius muscles. She was performing long walks for her aerobic exercise but felt that they made her fibromyalgia symptoms worse.      Past Medical History:   Diagnosis Date    Anxiety     Asthma     Colitis     Depression     Fibromyalgia     Irritable bowel syndrome       Past Surgical History:   Procedure Laterality Date    COLONOSCOPY  2016    HYSTERECTOMY, TOTAL ABDOMINAL      still has ovaries     Family History   Problem Relation Age of Onset    High Blood Pressure Mother     Depression Mother     Diabetes Father     Depression Father     Mental Illness Father     Other Maternal Grandmother         thyroid disease    Depression Maternal Grandmother     Depression Maternal Grandfather     Heart Disease Maternal Grandfather     Depression Paternal Grandmother     Stroke Paternal Grandmother     Depression Paternal Grandfather     Heart Disease Paternal Grandfather     Other Maternal Cousin         thyroid problems    Other Paternal Cousin         thyroid problems     Social History     Socioeconomic History    Marital status: Single     Spouse name: None    Number of children: None    Years of education: None    Highest education level: None   Occupational History    None   Social Needs    Financial resource strain: None    Food insecurity     Worry: None Inability: None    Transportation needs     Medical: None     Non-medical: None   Tobacco Use    Smoking status: Former Smoker     Packs/day: 0.25    Smokeless tobacco: Never Used    Tobacco comment: light smoker age 25 -20, approx 6 cig a day   Substance and Sexual Activity    Alcohol use: No    Drug use: No    Sexual activity: Yes     Partners: Male   Lifestyle    Physical activity     Days per week: None     Minutes per session: None    Stress: None   Relationships    Social connections     Talks on phone: None     Gets together: None     Attends Restoration service: None     Active member of club or organization: None     Attends meetings of clubs or organizations: None     Relationship status: None    Intimate partner violence     Fear of current or ex partner: None     Emotionally abused: None     Physically abused: None     Forced sexual activity: None   Other Topics Concern    None   Social History Narrative    None       Current Outpatient Medications   Medication Sig Dispense Refill    LORazepam (ATIVAN) 0.5 MG tablet Take 1 tablet by mouth as needed for Anxiety (Prior to MRI) for up to 2 doses. Take 1 tablet 30 minutes prior to the MRI, can repeat the dose after 30 minutes if needed. 2 tablet 0    diazePAM (VALIUM) 2 MG tablet Take 1 tablet by mouth as needed for Anxiety (Claustrophobia) for up to 2 doses. take 1 tablet 30 minutes prior to MRI brain. Can repeat the dose after 30 minutes if needed. 2 tablet 0    diphenhydrAMINE (BENADRYL ALLERGY) 25 MG capsule Take 1 capsule by mouth 2 times daily as needed (Severe headache with nausea and vomiting.) 28 capsule 2    aspirin-acetaminophen-caffeine (EXCEDRIN MIGRAINE) 250-250-65 MG per tablet Take 2 tablets by mouth as needed for Headaches (severe headaches) Take 2 tablets once every 24 hours (maximum: 2 tablets/day).    Note: Limit use to no more than 2 days per week 28 tablet 2    magnesium oxide (MAG-OX) 400 MG tablet Take 1 tablet by mouth daily 30 tablet 3    promethazine (PHENERGAN) 12.5 MG tablet Take 1 tablet by mouth as needed for Nausea (severe headaches with nausea and vomiting.) 28 tablet 2    DULoxetine (CYMBALTA) 20 MG extended release capsule Take 1 capsule by mouth daily 30 capsule 3    tiZANidine (ZANAFLEX) 4 MG tablet Take 1 tablet by mouth nightly Dr Kimble Beat 90 tablet 0    ibuprofen (ADVIL;MOTRIN) 400 MG tablet take 1 tablet by mouth every 6 hours if needed for pain 120 tablet 1    albuterol (PROVENTIL) (5 MG/ML) 0.5% nebulizer solution Take 1 mL by nebulization 4 times daily as needed for Wheezing 120 each 3    meclizine (ANTIVERT) 25 MG tablet Take 1 tablet by mouth 3 times daily as needed for Dizziness 90 tablet 1    albuterol sulfate  (90 Base) MCG/ACT inhaler Inhale 2 puffs into the lungs every 6 hours as needed for Wheezing or Shortness of Breath 1 Inhaler 1    ondansetron (ZOFRAN) 4 MG tablet Take 4 mg by mouth every 8 hours as needed for Nausea or Vomiting      Calcium Carb-Cholecalciferol (CALCIUM 1000 + D PO) Take by mouth daily 600mg/400mg      loperamide (IMODIUM) 2 MG capsule Take 2 mg by mouth 4 times daily as needed for Diarrhea      Lactase (DAIRY AID PO) Take by mouth as needed      Biotin 5000 MCG TABS Take by mouth daily      TURMERIC PO Take 1 capsule by mouth daily      COCONUT OIL PO Take 1 capsule by mouth daily      Multiple Vitamins-Minerals (THERAPEUTIC MULTIVITAMIN-MINERALS) tablet Take 1 tablet by mouth daily      dicyclomine (BENTYL) 20 MG tablet Take 20 mg by mouth every 6 hours      rizatriptan (MAXALT) 10 MG tablet Take 1 tablet by mouth once as needed for Migraine Take 1 pill immediately when you start having the aura or at the onset of headache.   Can take second tablet if headache has not resolved after 2 hours    Avoid taking more than 2 pills in 24 hours and not more than 4 pills in a week 10 tablet 3    rizatriptan (MAXALT-MLT) 10 MG disintegrating tablet Take 1 tablet by mouth once as needed for Migraine (as needed twice a week as needed) May repeat in 2 hours if needed 30 tablet 1     No current facility-administered medications for this visit. Allergies   Allergen Reactions    Latex Rash    Doxycycline Other (See Comments)     headaches    Lyrica [Pregabalin]      Weight gain, vision problems      Paxil [Paroxetine Hcl]      Tremors and stiffness    Qvar [Beclomethasone]      thrush    Seasonal     Serotonin Reuptake Inhibitors (Ssris)      zombie    Vicodin [Hydrocodone-Acetaminophen]     Z-Luke [Azithromycin]        Subjective:      Review of Systems  Constitutional: Negative for fever, chills and unexpected weight change. HENT: Negative for trouble swallowing. Respiratory: Negative for cough and shortness of breath. Cardiovascular: Negative for chest pain. Endocrine: Negative for polyuria. Genitourinary: Negative for dysuria, urgency, frequency, incontinence and difficulty urinating. Gastrointestinal: Negative for constipation or diarrhea. Musculoskeletal: Positive for arthralgias. Neurological: Positive for headaches. Negative for numbness, or tingling. Psychiatric: Negative for depressed mood or anxiety. Objective:     Physical Exam  /70   Pulse 75   Temp 98.2 °F (36.8 °C) (Temporal)   Ht 5' (1.524 m)   Wt 149 lb 3.2 oz (67.7 kg)   LMP 07/11/2018   BMI 29.14 kg/m²   Constitutional: She appears well-developed and well-nourished. In no distress. HEENT: NCAT, PERRL, EOMI. Mucous membranes pink and moist.  Pulmonary/Chest: Respirations WNL and unlabored. MSK: Functional ROM cervical spine is limited on rotation and lateral bending. Cervical flexion and extension is WNL. Palpable trigger points with referred pain noted in multiple areas of B cervical paraspinal muscles and B upper trapezius muscles. .  Strength 5/5 key muscles BUEs. Neurological: She is alert and oriented to person, place, and time.    Skin: Skin is warm dry into each of the 10 trigger point(s) followed by dry needling. The injection site was covered with a bandage. Complications: None, patient tolerated procedure well. Assessment:       Diagnosis Orders   1. Fibromyalgia     2. Trigger point with neck pain          Plan:      Trigger point injections as above. She is interested in pursuing acupuncture treatment. Encouraged low impact or no impact aerobic activity for fibromyalgia instead of the walking which worsened her symptoms. No orders of the defined types were placed in this encounter. Orders Placed This Encounter   Medications    lidocaine 1 % injection 5 mL     Return in about 8 weeks (around 1/12/2021). Electronically signedby Faith Dove MD on 11/24/2020 at 9:09 PM.     Please note that this chartwas generated using voice recognition Dragon dictation software. Although everyeffort was made to ensure the accuracy of this automated transcription, some errorsin transcription may have occurred.

## 2020-11-17 NOTE — PATIENT INSTRUCTIONS
Patient Education        Neck: Exercises  Introduction  Here are some examples of exercises for you to try. The exercises may be suggested for a condition or for rehabilitation. Start each exercise slowly. Ease off the exercises if you start to have pain. You will be told when to start these exercises and which ones will work best for you. How to do the exercises  Neck stretch   1. This stretch works best if you keep your shoulder down as you lean away from it. To help you remember to do this, start by relaxing your shoulders and lightly holding on to your thighs or your chair. 2. Tilt your head toward your shoulder and hold for 15 to 30 seconds. Let the weight of your head stretch your muscles. 3. If you would like a little added stretch, use your hand to gently and steadily pull your head toward your shoulder. For example, keeping your right shoulder down, lean your head to the left. 4. Repeat 2 to 4 times toward each shoulder. Diagonal neck stretch   1. Turn your head slightly toward the direction you will be stretching, and tilt your head diagonally toward your chest and hold for 15 to 30 seconds. 2. If you would like a little added stretch, use your hand to gently and steadily pull your head forward on the diagonal.  3. Repeat 2 to 4 times toward each side. Dorsal glide stretch   The dorsal glide stretches the back of the neck. If you feel pain, do not glide so far back. Some people find this exercise easier to do while lying on their backs with an ice pack on the neck. 1. Sit or stand tall and look straight ahead. 2. Slowly tuck your chin as you glide your head backward over your body  3. Hold for a count of 6, and then relax for up to 10 seconds. 4. Repeat 8 to 12 times. Chest and shoulder stretch   1. Sit or stand tall and glide your head backward as in the dorsal glide stretch. 2. Raise both arms so that your hands are next to your ears.   3. Take a deep breath, and as you breathe out, lower your elbows down and behind your back. You will feel your shoulder blades slide down and together, and at the same time you will feel a stretch across your chest and the front of your shoulders. 4. Hold for about 6 seconds, and then relax for up to 10 seconds. 5. Repeat 8 to 12 times. Strengthening: Hands on head   1. Move your head backward, forward, and side to side against gentle pressure from your hands, holding each position for about 6 seconds. 2. Repeat 8 to 12 times. Follow-up care is a key part of your treatment and safety. Be sure to make and go to all appointments, and call your doctor if you are having problems. It's also a good idea to know your test results and keep a list of the medicines you take. Where can you learn more? Go to https://[email or web address] org and sign in to your [employer] account. Enter P975 in the [employer] box to learn more about \"Neck: Exercises. \"     If you do not have an account, please click on the \"Sign Up Now\" link. Current as of: March 2, 2020               Content Version: 12.6  © 3552-4096 [employer]. Care instructions adapted under license by [facility] ([facility]). If you have questions about a medical condition or this instruction, always ask your healthcare professional. [address] any warranty or liability for your use of this information.

## 2020-12-22 ENCOUNTER — HOSPITAL ENCOUNTER (OUTPATIENT)
Age: 35
Discharge: HOME | End: 2020-12-22
Payer: MEDICARE

## 2020-12-22 LAB — VITAMIN D 25-HYDROXY: 30.5 NG/ML (ref 30–100)

## 2020-12-22 PROCEDURE — 36415 COLL VENOUS BLD VENIPUNCTURE: CPT

## 2020-12-22 PROCEDURE — 82306 VITAMIN D 25 HYDROXY: CPT

## 2021-01-05 ENCOUNTER — OFFICE VISIT (OUTPATIENT)
Dept: NEUROLOGY | Age: 36
End: 2021-01-05
Payer: MEDICARE

## 2021-01-05 VITALS — SYSTOLIC BLOOD PRESSURE: 115 MMHG | HEART RATE: 85 BPM | DIASTOLIC BLOOD PRESSURE: 79 MMHG | RESPIRATION RATE: 16 BRPM

## 2021-01-05 DIAGNOSIS — R51.9 WORSENING HEADACHES: Primary | ICD-10-CM

## 2021-01-05 DIAGNOSIS — G43.109 MIGRAINE WITH AURA AND WITHOUT STATUS MIGRAINOSUS, NOT INTRACTABLE: ICD-10-CM

## 2021-01-05 DIAGNOSIS — G43.709 CHRONIC MIGRAINE W/O AURA W/O STATUS MIGRAINOSUS, NOT INTRACTABLE: ICD-10-CM

## 2021-01-05 DIAGNOSIS — E55.9 VITAMIN D DEFICIENCY: ICD-10-CM

## 2021-01-05 DIAGNOSIS — G43.109 OCULAR MIGRAINE: ICD-10-CM

## 2021-01-05 PROCEDURE — G8427 DOCREV CUR MEDS BY ELIG CLIN: HCPCS | Performed by: STUDENT IN AN ORGANIZED HEALTH CARE EDUCATION/TRAINING PROGRAM

## 2021-01-05 PROCEDURE — G8484 FLU IMMUNIZE NO ADMIN: HCPCS | Performed by: STUDENT IN AN ORGANIZED HEALTH CARE EDUCATION/TRAINING PROGRAM

## 2021-01-05 PROCEDURE — 1036F TOBACCO NON-USER: CPT | Performed by: STUDENT IN AN ORGANIZED HEALTH CARE EDUCATION/TRAINING PROGRAM

## 2021-01-05 PROCEDURE — G8419 CALC BMI OUT NRM PARAM NOF/U: HCPCS | Performed by: STUDENT IN AN ORGANIZED HEALTH CARE EDUCATION/TRAINING PROGRAM

## 2021-01-05 PROCEDURE — 99214 OFFICE O/P EST MOD 30 MIN: CPT | Performed by: STUDENT IN AN ORGANIZED HEALTH CARE EDUCATION/TRAINING PROGRAM

## 2021-01-05 RX ORDER — MELOXICAM 7.5 MG/1
7.5 TABLET ORAL PRN
Qty: 20 TABLET | Refills: 3 | Status: SHIPPED | OUTPATIENT
Start: 2021-01-05

## 2021-01-05 RX ORDER — CYCLOBENZAPRINE HCL 5 MG
5 TABLET ORAL 2 TIMES DAILY PRN
Qty: 20 TABLET | Refills: 2 | Status: SHIPPED | OUTPATIENT
Start: 2021-01-05 | End: 2021-01-15

## 2021-01-05 RX ORDER — ERGOCALCIFEROL 1.25 MG/1
50000 CAPSULE ORAL WEEKLY
Qty: 12 CAPSULE | Refills: 0 | Status: SHIPPED | OUTPATIENT
Start: 2021-01-05 | End: 2021-10-07

## 2021-01-05 ASSESSMENT — ENCOUNTER SYMPTOMS
EYE DISCHARGE: 0
EYE PAIN: 0
VOMITING: 0
BACK PAIN: 1
COUGH: 0
SINUS PAIN: 0
ABDOMINAL PAIN: 0
SHORTNESS OF BREATH: 0
PHOTOPHOBIA: 1
CONSTIPATION: 0
NAUSEA: 0
DIARRHEA: 0
SORE THROAT: 0

## 2021-01-05 NOTE — PROGRESS NOTES
[address] [place] [address],  [address], [facility], [facility], [address] [place] [address]  P: [phone]  F: [phone]    NEUROLOGY CLINIC NOTE     PATIENT NAME: Atilio Guzman  PATIENT MRN: [id number]  PRIMARY CARE PHYSICIAN: Oliver Torres MD      Interval History: 1/5/2021   Patient was last seen in the clinic in November 2020. Since last visit she endorses overall improvement in her headache, endorses worsening of her headache with stress. She did not have many headaches in the month of November but then in December had worsening headaches due to the stress. Currently having headache maybe once or twice in a week. During last clinic visit she was started on magnesium oxide 400 mg daily, endorses improvement in her headaches with that. Taking Excedrin Migraine as needed for mild to moderate headache and Maxalt for severe headaches. Endorses improvement in the headache with those medications, denies any side effects on medications. Had tried Benadryl with Phenergan for severe headaches, endorses improvement in the symptoms with that. She was requesting for Mobic as needed for her body ache and headache, endorses improvement in the symptoms with that and wants to try it again. MRI of the brain was unremarkable. Vitamin D was on the lower side of the normal.    Patient denies any other neurologic concerns during this visit. Notes from 11/4/2020. HPI:      Atilio Guzman is a 28 y.o. right handed  female was seen in the clinic for headaches    History obtained from Patient and medical records      Headaches  Headaches started having headaches since her childhood, getting worse since age 21. Patient endorses headache in variable locations, depending upon the triggers.   She endorses headache at least once or twice in a week, severe headache maybe 2 times in a month, 7-8/10 intensity, throbbing/pulsating, pressure-like/squeezing, sometimes sharp stabbing pain or dull achy pain.  Headaches are usually associated with nausea, vomiting, photophobia, phonophobia, blurry vision, sparkling light, zigzag lines, flashes of light, had an episode of decreased vision in the left eye around 5 times until now. She also endorses focal tingling and numbness, dizziness and lightheadedness and fatigue. Denies any focal muscle weakness or speech changes or personality changes or mood changes. Denies any neck pain or radicular symptoms. Denies any redness in the eyes or watering from the eyes or watering from the nose or facial sweating. Usually headaches are gradual in onset, sometimes she is waking up with a headache in the morning usually builds up in half an hour, can last for hours to days depending on the medication she is taking. Tommy Angel Headaches are typically triggered by stress, skipping meals, hunger , fasting, strong smells, bright lights, sunlight, noises, seasonal changes and weather changes  Headaches are relieved by lying in a darkened room    Headache free days per month: 15-20  Cutaneous allodynia: yes -most of the time  History of:  Family history of headaches or migraines: yes - father  Renal stones: no   Motion sickness:  no  Head/Neck Trauma: yes   Stressors: no  Sleep: 5- 6  Hours,            Difficulty in initiating or maintaining sleep: no,              Snoring: no  Psych/Mood: Appropriate  Caffeine: 4-5 Oz coffee per day    Headache Medications  Current abortive meds: Maxalt patient endorses significant improvement in her headache with Maxalt.   Excedrin Migraine, Motrin, Tylenol  Current prophylactic meds: Magnesium oxide  Previous abortive medications tried: Imitrex-endorses feeling sick to stomach and vomiting with it  Previous prophylactic medications tried: Nortriptyline, Topamax had nausea with it                                                                       PATIENT HISTORY:     Past Medical History:   Diagnosis Date    Anxiety     Asthma     Colitis     Depression     Fibromyalgia     Irritable bowel syndrome         Past Surgical History:   Procedure Laterality Date    COLONOSCOPY  2016    HYSTERECTOMY, TOTAL ABDOMINAL      still has ovaries        Social History     Socioeconomic History    Marital status: Single     Spouse name: Not on file    Number of children: Not on file    Years of education: Not on file    Highest education level: Not on file   Occupational History    Not on file   Social Needs    Financial resource strain: Not on file    Food insecurity     Worry: Not on file     Inability: Not on file    Transportation needs     Medical: Not on file     Non-medical: Not on file   Tobacco Use    Smoking status: Former Smoker     Packs/day: 0.25    Smokeless tobacco: Never Used    Tobacco comment: light smoker age 25 -20, approx 6 cig a day   Substance and Sexual Activity    Alcohol use: No    Drug use: No    Sexual activity: Yes     Partners: Male   Lifestyle    Physical activity     Days per week: Not on file     Minutes per session: Not on file    Stress: Not on file   Relationships    Social connections     Talks on phone: Not on file     Gets together: Not on file     Attends Jew service: Not on file     Active member of club or organization: Not on file     Attends meetings of clubs or organizations: Not on file     Relationship status: Not on file    Intimate partner violence     Fear of current or ex partner: Not on file     Emotionally abused: Not on file     Physically abused: Not on file     Forced sexual activity: Not on file   Other Topics Concern    Not on file   Social History Narrative    Not on file        Current Outpatient Medications   Medication Sig Dispense Refill    vitamin D (ERGOCALCIFEROL) 1.25 MG (71264 UT) CAPS capsule Take 1 capsule by mouth once a week for 12 doses 12 capsule 0    cyclobenzaprine (FLEXERIL) 5 MG tablet Take 1 tablet by mouth 2 times daily as needed for Muscle spasms 20 tablet 2  meloxicam (MOBIC) 7.5 MG tablet Take 1 tablet by mouth as needed for Pain (bodyache and headaches) 20 tablet 3    diphenhydrAMINE (BENADRYL ALLERGY) 25 MG capsule Take 1 capsule by mouth 2 times daily as needed (Severe headache with nausea and vomiting.) 28 capsule 2    aspirin-acetaminophen-caffeine (EXCEDRIN MIGRAINE) 250-250-65 MG per tablet Take 2 tablets by mouth as needed for Headaches (severe headaches) Take 2 tablets once every 24 hours (maximum: 2 tablets/day).    Note: Limit use to no more than 2 days per week 28 tablet 2    promethazine (PHENERGAN) 12.5 MG tablet Take 1 tablet by mouth as needed for Nausea (severe headaches with nausea and vomiting.) 28 tablet 2    tiZANidine (ZANAFLEX) 4 MG tablet Take 1 tablet by mouth nightly Dr Aydee Miller 90 tablet 0    ibuprofen (ADVIL;MOTRIN) 400 MG tablet take 1 tablet by mouth every 6 hours if needed for pain 120 tablet 1    albuterol (PROVENTIL) (5 MG/ML) 0.5% nebulizer solution Take 1 mL by nebulization 4 times daily as needed for Wheezing 120 each 3    meclizine (ANTIVERT) 25 MG tablet Take 1 tablet by mouth 3 times daily as needed for Dizziness 90 tablet 1    albuterol sulfate  (90 Base) MCG/ACT inhaler Inhale 2 puffs into the lungs every 6 hours as needed for Wheezing or Shortness of Breath 1 Inhaler 1    ondansetron (ZOFRAN) 4 MG tablet Take 4 mg by mouth every 8 hours as needed for Nausea or Vomiting      Calcium Carb-Cholecalciferol (CALCIUM 1000 + D PO) Take by mouth daily 600mg/400mg      loperamide (IMODIUM) 2 MG capsule Take 2 mg by mouth 4 times daily as needed for Diarrhea      Lactase (DAIRY AID PO) Take by mouth as needed      Biotin 5000 MCG TABS Take by mouth daily      TURMERIC PO Take 1 capsule by mouth daily      COCONUT OIL PO Take 1 capsule by mouth daily      Multiple Vitamins-Minerals (THERAPEUTIC MULTIVITAMIN-MINERALS) tablet Take 1 tablet by mouth daily      dicyclomine (BENTYL) 20 MG tablet Take 20 mg by mouth every 6 hours      rizatriptan (MAXALT) 10 MG tablet Take 1 tablet by mouth once as needed for Migraine Take 1 pill immediately when you start having the aura or at the onset of headache. Can take second tablet if headache has not resolved after 2 hours    Avoid taking more than 2 pills in 24 hours and not more than 4 pills in a week 10 tablet 3    rizatriptan (MAXALT-MLT) 10 MG disintegrating tablet Take 1 tablet by mouth once as needed for Migraine (as needed twice a week as needed) May repeat in 2 hours if needed 30 tablet 1     No current facility-administered medications for this visit. ALLERGIES  Allergies   Allergen Reactions    Latex Rash    Doxycycline Other (See Comments)     headaches    Lyrica [Pregabalin]      Weight gain, vision problems      Paxil [Paroxetine Hcl]      Tremors and stiffness    Qvar [Beclomethasone]      thrush    Seasonal     Serotonin Reuptake Inhibitors (Ssris)      zombie    Vicodin [Hydrocodone-Acetaminophen]     Z-Luke [Azithromycin]         REVIEW OF SYSTEMS:     Review of Systems   Constitutional: Positive for fatigue. Negative for chills, diaphoresis, fever and unexpected weight change. HENT: Negative for congestion, ear discharge, ear pain, hearing loss, sinus pain and sore throat. Eyes: Positive for photophobia and visual disturbance. Negative for pain and discharge. Respiratory: Negative for cough and shortness of breath. Cardiovascular: Negative for chest pain, palpitations and leg swelling. Gastrointestinal: Negative for abdominal pain, constipation, diarrhea, nausea and vomiting. Endocrine: Negative for polydipsia and polyuria. Genitourinary: Negative for difficulty urinating and hematuria. Musculoskeletal: Positive for arthralgias, back pain, gait problem, neck pain and neck stiffness. Skin: Negative for pallor and rash. Neurological: Positive for speech difficulty and headaches.  Negative for dizziness, tremors, seizures, syncope, facial asymmetry, weakness, light-headedness and numbness. Hematological: Does not bruise/bleed easily. Psychiatric/Behavioral: Positive for behavioral problems, confusion, decreased concentration, dysphoric mood and sleep disturbance. Negative for agitation and hallucinations. The patient is nervous/anxious. VITALS  /79 (Site: Right Upper Arm, Position: Sitting, Cuff Size: Medium Adult)   Pulse 85   Resp 16   LMP 07/11/2018      PHYSICAL EXAMINATION:     Constitutional: Well developed, not in distress  Head:  normocephalic, atraumatic. Neck: supple, no carotid bruits  Respiratory: Clear to auscultation bilaterally   Cardiovascular: normal rate, regular rhythm, no murmur, gallop, rub. Abdomen: Soft, nontender, nondistended  Extremities:  peripheral pulses palpable, no pedal edema  Psych: normal affect      NEUROLOGICAL EXAMINATION:     Mental status   Alert and oriented; intact memory with no confusion, speech or language problems; No hallucinations or delusions     Cranial nerves   II - visual fields intact to confrontation                                                III, IV, VI  extra-ocular muscles full: no pupillary defect; no DENICE, no nystagmus, no ptosis   V - normal facial sensation                                                               VII - normal facial symmetry                                                             VIII - intact hearing                                                                             IX, X - symmetrical palate                                                                  XI - symmetrical shoulder shrug                                                       XII - midline tongue without atrophy or fasciculation     Motor function  Normal muscle bulk and tone  Muscle strength: normal power 5/5     Sensory function Intact to light touch in bilateral upper and lower extremities.       Cerebellar No involuntary movements or tremors effects: Chest tightness, neck and jaw discomfort, numbness/tingling, sensation of cold, nausea, dizziness, muscle weakness       - For rescue   Benadryl 25 mg twice daily PRN with Phenergan 12.5 mg twice daily as needed for nausea vomiting associated with severe headaches. - If acute medications fail, including the rescue medication, and you still have a bad headache, please call the clinic for further treatment options, including possible nerve blocks. -Flexeril 5 mg twice daily as needed for the muscle spasms    - Tests:    MRI brain with and without contrast -was unremarkable, images reviewed and discussed with patient. Patient has a family history of multiple sclerosis- cousins   LABS: Vitamin D level-30.5 on 12/22/2020    We will give vitamin D 50,000 units weekly for 12 weeks. - Maintain Headache diary to see if there is a pattern to your headaches. - Discussed headache triggers with patients and instructed to avoid them. Non pharmacological preventive strategies for migraine headaches:   Wear sunglasses when in bright light.  Avoid loud and continuously noisy surroundings   avoid flashy lights   Keep yourself hydrated (8 to 10 glasses of water)   ensure 3 to 4 meals at regular times a day   look for and avoid food related triggers (chocolate ronel, etc.)   maintain good sleep hygiene, regular 8 to 10 hours of sleep   avoid staying up late, late night partying.      - Follow up in the clinic in 3 months  - Instructed patient to call the clinic if symptoms worsen or develop any new symptoms. I have spent 30 minutes  with the patient more than 50% of this time was spent reviewing medical records, discussing imaging findings, counseling regarding medications side effects and compliance, healthy habits and coordinating care.       Electronically signed by Blake Veliz MD on 1/5/2021 at 3:15 PM

## 2021-01-14 ENCOUNTER — OFFICE VISIT (OUTPATIENT)
Dept: PHYSICAL MEDICINE AND REHAB | Age: 36
End: 2021-01-14
Payer: MEDICARE

## 2021-01-14 VITALS
TEMPERATURE: 98.2 F | HEART RATE: 75 BPM | DIASTOLIC BLOOD PRESSURE: 69 MMHG | BODY MASS INDEX: 29.06 KG/M2 | SYSTOLIC BLOOD PRESSURE: 117 MMHG | WEIGHT: 148.8 LBS

## 2021-01-14 DIAGNOSIS — M79.7 FIBROMYALGIA: Primary | ICD-10-CM

## 2021-01-14 DIAGNOSIS — M54.2 TRIGGER POINT WITH NECK PAIN: ICD-10-CM

## 2021-01-14 DIAGNOSIS — M54.9 TRIGGER POINT WITH BACK PAIN: ICD-10-CM

## 2021-01-14 PROCEDURE — 99213 OFFICE O/P EST LOW 20 MIN: CPT | Performed by: PHYSICAL MEDICINE & REHABILITATION

## 2021-01-14 PROCEDURE — 20553 NJX 1/MLT TRIGGER POINTS 3/>: CPT | Performed by: PHYSICAL MEDICINE & REHABILITATION

## 2021-01-14 PROCEDURE — 1036F TOBACCO NON-USER: CPT | Performed by: PHYSICAL MEDICINE & REHABILITATION

## 2021-01-14 PROCEDURE — G8484 FLU IMMUNIZE NO ADMIN: HCPCS | Performed by: PHYSICAL MEDICINE & REHABILITATION

## 2021-01-14 PROCEDURE — G8427 DOCREV CUR MEDS BY ELIG CLIN: HCPCS | Performed by: PHYSICAL MEDICINE & REHABILITATION

## 2021-01-14 PROCEDURE — G8419 CALC BMI OUT NRM PARAM NOF/U: HCPCS | Performed by: PHYSICAL MEDICINE & REHABILITATION

## 2021-01-14 RX ORDER — LIDOCAINE HYDROCHLORIDE 10 MG/ML
7 INJECTION, SOLUTION INFILTRATION; PERINEURAL ONCE
Status: COMPLETED | OUTPATIENT
Start: 2021-01-14 | End: 2021-01-14

## 2021-01-14 RX ADMIN — LIDOCAINE HYDROCHLORIDE 7 ML: 10 INJECTION, SOLUTION INFILTRATION; PERINEURAL at 16:11

## 2021-01-14 SDOH — HEALTH STABILITY: MENTAL HEALTH: HOW MANY STANDARD DRINKS CONTAINING ALCOHOL DO YOU HAVE ON A TYPICAL DAY?: NOT ASKED

## 2021-01-14 SDOH — HEALTH STABILITY: MENTAL HEALTH: HOW OFTEN DO YOU HAVE A DRINK CONTAINING ALCOHOL?: NOT ASKED

## 2021-01-14 NOTE — PROGRESS NOTES
[facility]  [facility]  [address] [place] [address]  [place] [address]  Dept: [phone]  Dept Fax: [phone]    Outpatient Followup Note    Audrey Pink, 28 y.o., female, presents for follow up c/o of Follow-up (would like injection in the neck/shoulder area and the low back)  . HPI:     HPI  Patient with fibromyalgia who returns today to repeat trigger point injections. She reports 50% relief of her neck pain with previous injections. She also reports reduced frequency of headaches. She is being treated by Dr. Adams Leigh in neurology for her headaches. Her pain is described as muscle tightness and aching. She notes pain in her neck, mid back and low back. Pain is paraspinal with no radiation or associated symptoms. It is moderate-severe. Some relief with heat and trigger point injections. She has not pursued acupuncture treatment yet due to time constraints but still has the information.      Past Medical History:   Diagnosis Date    Anxiety     Asthma     Colitis     Depression     Fibromyalgia     Irritable bowel syndrome       Past Surgical History:   Procedure Laterality Date    COLONOSCOPY  2016    HYSTERECTOMY, TOTAL ABDOMINAL      still has ovaries     Family History   Problem Relation Age of Onset    High Blood Pressure Mother     Depression Mother     Diabetes Father     Depression Father     Mental Illness Father     Other Maternal Grandmother         thyroid disease    Depression Maternal Grandmother     Depression Maternal Grandfather     Heart Disease Maternal Grandfather     Depression Paternal Grandmother     Stroke Paternal Grandmother     Depression Paternal Grandfather     Heart Disease Paternal Grandfather     Other Maternal Cousin         thyroid problems    Other Paternal Cousin         thyroid problems     Social History     Socioeconomic History    Marital status: Single     Spouse name: None    Number of children: None    Years of education: None    Highest education level: None   Occupational History    None   Social Needs    Financial resource strain: None    Food insecurity     Worry: None     Inability: None    Transportation needs     Medical: None     Non-medical: None   Tobacco Use    Smoking status: Former Smoker     Packs/day: 0.25     Types: Cigarettes    Smokeless tobacco: Never Used    Tobacco comment: quit over 12 years ago   Substance and Sexual Activity    Alcohol use: No     Comment: very rarely    Drug use: No    Sexual activity: Yes     Partners: Male   Lifestyle    Physical activity     Days per week: None     Minutes per session: None    Stress: None   Relationships    Social connections     Talks on phone: None     Gets together: None     Attends Restorationism service: None     Active member of club or organization: None     Attends meetings of clubs or organizations: None     Relationship status: None    Intimate partner violence     Fear of current or ex partner: None     Emotionally abused: None     Physically abused: None     Forced sexual activity: None   Other Topics Concern    None   Social History Narrative    None       Current Outpatient Medications   Medication Sig Dispense Refill    Tens Unit MISC by Does not apply route For Pain Management     15 minutes daily - up to 1 hour as tolerated 1 each 0    vitamin D (ERGOCALCIFEROL) 1.25 MG (53889 UT) CAPS capsule Take 1 capsule by mouth once a week for 12 doses 12 capsule 0    cyclobenzaprine (FLEXERIL) 5 MG tablet Take 1 tablet by mouth 2 times daily as needed for Muscle spasms 20 tablet 2    meloxicam (MOBIC) 7.5 MG tablet Take 1 tablet by mouth as needed for Pain (bodyache and headaches) 20 tablet 3    diphenhydrAMINE (BENADRYL ALLERGY) 25 MG capsule Take 1 capsule by mouth 2 times daily as needed (Severe headache with nausea and vomiting.) 28 capsule 2    aspirin-acetaminophen-caffeine (EXCEDRIN MIGRAINE) 250-250-65 MG per tablet Take 2 tablets by mouth as needed for Headaches (severe headaches) Take 2 tablets once every 24 hours (maximum: 2 tablets/day). Note: Limit use to no more than 2 days per week 28 tablet 2    promethazine (PHENERGAN) 12.5 MG tablet Take 1 tablet by mouth as needed for Nausea (severe headaches with nausea and vomiting.) 28 tablet 2    tiZANidine (ZANAFLEX) 4 MG tablet Take 1 tablet by mouth nightly Dr Landon Em 90 tablet 0    ibuprofen (ADVIL;MOTRIN) 400 MG tablet take 1 tablet by mouth every 6 hours if needed for pain 120 tablet 1    albuterol (PROVENTIL) (5 MG/ML) 0.5% nebulizer solution Take 1 mL by nebulization 4 times daily as needed for Wheezing 120 each 3    meclizine (ANTIVERT) 25 MG tablet Take 1 tablet by mouth 3 times daily as needed for Dizziness 90 tablet 1    albuterol sulfate  (90 Base) MCG/ACT inhaler Inhale 2 puffs into the lungs every 6 hours as needed for Wheezing or Shortness of Breath 1 Inhaler 1    ondansetron (ZOFRAN) 4 MG tablet Take 4 mg by mouth every 8 hours as needed for Nausea or Vomiting      Calcium Carb-Cholecalciferol (CALCIUM 1000 + D PO) Take by mouth daily 600mg/400mg      loperamide (IMODIUM) 2 MG capsule Take 2 mg by mouth 4 times daily as needed for Diarrhea      Lactase (DAIRY AID PO) Take by mouth as needed      Biotin 5000 MCG TABS Take by mouth daily      TURMERIC PO Take 1 capsule by mouth daily      COCONUT OIL PO Take 1 capsule by mouth daily      Multiple Vitamins-Minerals (THERAPEUTIC MULTIVITAMIN-MINERALS) tablet Take 1 tablet by mouth daily      dicyclomine (BENTYL) 20 MG tablet Take 20 mg by mouth every 6 hours      rizatriptan (MAXALT-MLT) 10 MG disintegrating tablet Take 1 tablet by mouth once as needed for Migraine (as needed twice a week as needed) May repeat in 2 hours if needed 30 tablet 1     No current facility-administered medications for this visit.       Allergies   Allergen Reactions    Latex Rash    Doxycycline Other (See Comments) headaches    Lyrica [Pregabalin]      Weight gain, vision problems      Paxil [Paroxetine Hcl]      Tremors and stiffness    Qvar [Beclomethasone]      thrush    Seasonal     Serotonin Reuptake Inhibitors (Ssris)      zombie    Vicodin [Hydrocodone-Acetaminophen]     Z-Luke [Azithromycin]        Subjective:      Review of Systems  Constitutional: Negative for fever, chills and unexpected weight change. HENT: Negative for trouble swallowing. Respiratory: Negative for cough and shortness of breath. Cardiovascular: Negative for chest pain. Endocrine: Negative for polyuria. Genitourinary: Negative for dysuria, urgency, frequency, incontinence and difficulty urinating. Gastrointestinal: Negative for constipation or diarrhea. Musculoskeletal: Negative for arthralgias. Neurological: Negative for headaches, numbness, or tingling. Psychiatric: Negative for depressed mood or anxiety. Objective:     Physical Exam  /69   Pulse 75   Temp 98.2 °F (36.8 °C)   Wt 148 lb 12.8 oz (67.5 kg)   LMP 07/11/2018   BMI 29.06 kg/m²   Constitutional: She appears well-developed and well-nourished. In no distress. HEENT: NCAT, PERRL, EOMI. Mucous membranes pink and moist.  Pulmonary/Chest: Respirations WNL and unlabored. MSK: Functional ROM all extremities and cervical and lumbar spine. Strength 5/5 key muscles BUE and BLEs. Palpable trigger points B cervical paraspinal and upper trapezius muscles with referred pain, worse on the L. Palpable trigger points with referred pain at approximately T8 medial to inferior scapular angle and bilateral lumbar paraspinal trigger points with referred pain at L3-4. Neurological: She is alert and oriented to person, place, and time. Skin: Skin is warm dry and intact with good turgor. Psychiatric: She has a normal mood and affect. Her behavior is normal. Thought content normal.   Nursing note and vitals reviewed.     PROCEDURE:  Trigger Point Procedural Note    Indication: Severe pain and pain control    Procedure: 13 Trigger Points were identified in the Bilateral cervical paraspinal muscles, B upper trapezius muscles, B thoracic muscles at medial scapular inferior angle, B lumbar paraspinal muscles at approximately L3-4. The patient was placed in the appropriate position and the area over the trigger point was prepped with iodine and alcohol. Injection was performed into the trigger point area using 0.5 ml Lidocaine 1% into each of the 13 trigger point(s) followed by dry needling. The injection site was covered with a bandage. Complications: None, patient tolerated procedure well. Imaging: None new    Assessment:       Diagnosis Orders   1. Fibromyalgia     2. Trigger point with neck pain     3. Trigger point with back pain          Plan:      Trial compound cream (8G - gabapentin, baclofen, diclofenac, DMSO)    TENS unit    Trigger point injections as above  No orders of the defined types were placed in this encounter. Orders Placed This Encounter   Medications    lidocaine 1 % injection 7 mL    Tens Unit MISC     Sig: by Does not apply route For Pain Management     15 minutes daily - up to 1 hour as tolerated     Dispense:  1 each     Refill:  0     Return in about 8 weeks (around 3/11/2021). Electronically signedby Mahi Jenkins MD on 1/14/2021 at 4:26 PM.     Please note that this chartwas generated using voice recognition Dragon dictation software. Although everyeffort was made to ensure the accuracy of this automated transcription, some errorsin transcription may have occurred.

## 2021-09-27 ENCOUNTER — HOSPITAL ENCOUNTER (OUTPATIENT)
Age: 36
Discharge: HOME | End: 2021-09-27
Payer: MEDICARE

## 2021-09-27 DIAGNOSIS — G47.10 HYPERSOMNIA: ICD-10-CM

## 2021-09-27 DIAGNOSIS — M51.9 LUMBAR DISC DISEASE: ICD-10-CM

## 2021-09-27 DIAGNOSIS — R53.82 CHRONIC FATIGUE: ICD-10-CM

## 2021-09-27 DIAGNOSIS — Z87.448 HISTORY OF PYELONEPHRITIS: ICD-10-CM

## 2021-09-27 DIAGNOSIS — G47.00 FREQUENT NOCTURNAL AWAKENING: ICD-10-CM

## 2021-09-27 DIAGNOSIS — R53.1 WEAKNESS: ICD-10-CM

## 2021-09-27 DIAGNOSIS — Z90.710 HISTORY OF HYSTERECTOMY: ICD-10-CM

## 2021-09-27 LAB — TSH SERPL DL<=0.05 MIU/L-ACNC: 1.99 MIU/L (ref 0.3–5)

## 2021-09-27 PROCEDURE — 86225 DNA ANTIBODY NATIVE: CPT

## 2021-09-27 PROCEDURE — 82746 ASSAY OF FOLIC ACID SERUM: CPT

## 2021-09-27 PROCEDURE — 86038 ANTINUCLEAR ANTIBODIES: CPT

## 2021-09-27 PROCEDURE — 84443 ASSAY THYROID STIM HORMONE: CPT

## 2021-09-27 PROCEDURE — 82525 ASSAY OF COPPER: CPT

## 2021-09-27 PROCEDURE — 82670 ASSAY OF TOTAL ESTRADIOL: CPT

## 2021-09-27 PROCEDURE — 82607 VITAMIN B-12: CPT

## 2021-09-27 PROCEDURE — 82533 TOTAL CORTISOL: CPT

## 2021-09-27 PROCEDURE — 36415 COLL VENOUS BLD VENIPUNCTURE: CPT

## 2021-09-27 PROCEDURE — 82024 ASSAY OF ACTH: CPT

## 2021-09-27 PROCEDURE — 87086 URINE CULTURE/COLONY COUNT: CPT

## 2021-09-27 PROCEDURE — 83525 ASSAY OF INSULIN: CPT

## 2021-09-28 LAB
ADRENOCORTICOTROPIC HORMONE: 8 PG/ML (ref 6–58)
CORTISOL COLLECTION INFO: NORMAL
CORTISOL: 4 UG/DL (ref 2.7–18.4)
CULTURE: NORMAL
ESTRADIOL LEVEL: 37 PG/ML (ref 27–314)
FOLATE: 14.5 NG/ML
INSULIN COMMENT: NORMAL
INSULIN REFERENCE RANGE:: NORMAL
INSULIN: 5 MU/L
Lab: NORMAL
SPECIMEN DESCRIPTION: NORMAL
VITAMIN B-12: >2000 PG/ML (ref 232–1245)

## 2021-09-29 LAB
ANTI DNA DOUBLE STRANDED: 2.3 IU/ML
ANTI-NUCLEAR ANTIBODY (ANA): NEGATIVE
ENA ANTIBODIES SCREEN: 0.2 U/ML

## 2021-09-30 LAB — COPPER: 99.9 UG/DL (ref 80–155)

## 2022-05-31 ENCOUNTER — HOSPITAL ENCOUNTER (OUTPATIENT)
Dept: PHYSICAL THERAPY | Age: 37
Setting detail: THERAPIES SERIES
Discharge: HOME | End: 2022-05-31
Payer: MEDICARE

## 2022-05-31 PROCEDURE — 97750 PHYSICAL PERFORMANCE TEST: CPT

## 2022-05-31 NOTE — PROGRESS NOTES
[facility] Outpatient Physical Therapy  [address] [place] [address]. [address]         Phone: [phone]       Fax: [phone]    Physical Therapy [facility]       Date:  2022  Patient: Arnol Manzo  : 1985  MRN: [id number]  Physician: Piper Pendleton MD    Insurance: [employer]   Medical Diagnosis: Fibromyalgia   Rehab Codes: M60.80  Onset date: 10-          Subjective:   CC:Generlized pain   HPI: (10-)  The patient has complaints of sever pain and limited function due to current pain. She rates her pain today at 7/10 mainly in her low back, but has pain throughout her body. Objective:  See full test for complete results. Assessment:   The patient is not able to participate in sedentary work due to her current pain levels and limited mobility. Plan:  Fllow up with physician for on going treatments.        Treatment Charges: Mins Units   [x] Evaluation       [x]   min  10       TOTAL TREATMENT TIME: 150 min     Time in: 8:30 AM Time Out:11:00 AM    Electronically signed by: Marilou Castrejon PT